# Patient Record
Sex: FEMALE | Race: WHITE | Employment: PART TIME | URBAN - METROPOLITAN AREA
[De-identification: names, ages, dates, MRNs, and addresses within clinical notes are randomized per-mention and may not be internally consistent; named-entity substitution may affect disease eponyms.]

---

## 2017-10-06 ENCOUNTER — HOSPITAL ENCOUNTER (OUTPATIENT)
Dept: WOMENS IMAGING | Age: 52
Discharge: HOME | End: 2017-10-06
Payer: COMMERCIAL

## 2017-10-06 DIAGNOSIS — Z12.31 SCREENING MAMMOGRAM, ENCOUNTER FOR: ICD-10-CM

## 2017-10-06 PROCEDURE — 77063 BREAST TOMOSYNTHESIS BI: CPT

## 2017-11-16 ENCOUNTER — OFFICE VISIT (OUTPATIENT)
Dept: PODIATRY | Age: 52
End: 2017-11-16
Payer: COMMERCIAL

## 2017-11-16 VITALS
HEIGHT: 62 IN | BODY MASS INDEX: 33.13 KG/M2 | DIASTOLIC BLOOD PRESSURE: 79 MMHG | HEART RATE: 87 BPM | SYSTOLIC BLOOD PRESSURE: 130 MMHG | WEIGHT: 180 LBS

## 2017-11-16 DIAGNOSIS — M19.071 PRIMARY OSTEOARTHRITIS OF RIGHT FOOT: ICD-10-CM

## 2017-11-16 DIAGNOSIS — M20.5X1 HALLUX LIMITUS, RIGHT: Primary | ICD-10-CM

## 2017-11-16 PROCEDURE — 73630 X-RAY EXAM OF FOOT: CPT | Performed by: PODIATRIST

## 2017-11-16 PROCEDURE — 99203 OFFICE O/P NEW LOW 30 MIN: CPT | Performed by: PODIATRIST

## 2017-11-16 RX ORDER — M-VIT,TX,IRON,MINS/CALC/FOLIC 27MG-0.4MG
1 TABLET ORAL DAILY
COMMUNITY
End: 2018-09-19 | Stop reason: SDUPTHER

## 2017-11-16 RX ORDER — FENOFIBRATE 90 MG/1
1 CAPSULE ORAL
COMMUNITY
End: 2018-07-31 | Stop reason: ALTCHOICE

## 2017-11-16 RX ORDER — ATORVASTATIN CALCIUM 40 MG/1
1 TABLET, FILM COATED ORAL
COMMUNITY

## 2017-11-16 ASSESSMENT — ENCOUNTER SYMPTOMS
CONSTIPATION: 0
NAUSEA: 0
VOMITING: 0
COLOR CHANGE: 0
DIARRHEA: 0

## 2017-11-16 NOTE — PROGRESS NOTES
[facility]  New Patient History and Physical    Chief Complaint:   Chief Complaint   Patient presents with    Foot Pain     right hallux        HPI: Valeria Burrell is a 46 y.o. female who presents to the office today complaining of right foot pain. Symptoms began several year(s) ago. Patient relates pain is Present. Pain is rated 4 out of 10 and is described as waxing and waning. Treatments prior to today's visit include: none. She had an injury over 3 years ago. Pain has come and gone, but now getting worse, bump is getting bigger, rubs on shoes. No treatment. Currently denies F/C/N/V. No Known Allergies    No past medical history on file. Prior to Admission medications    Medication Sig Start Date End Date Taking? Authorizing Provider   atorvastatin (LIPITOR) 40 MG tablet Take 1 tablet by mouth   Yes Historical Provider, MD   fenofibrate micronized (LOFIBRA) 90 MG CAPS capsule Take 1 capsule by mouth   Yes Historical Provider, MD   Multiple Vitamins-Minerals (THERAPEUTIC MULTIVITAMIN-MINERALS) tablet Take 1 tablet by mouth daily   Yes Historical Provider, MD       No past surgical history on file. No family history on file. Social History   Substance Use Topics    Smoking status: Never Smoker    Smokeless tobacco: Never Used    Alcohol use No       Review of Systems   Constitutional: Negative for chills, diaphoresis, fatigue, fever and unexpected weight change. Cardiovascular: Negative for leg swelling. Gastrointestinal: Negative for constipation, diarrhea, nausea and vomiting. Musculoskeletal: Positive for gait problem. Negative for arthralgias and joint swelling. Skin: Negative for color change, pallor, rash and wound. Neurological: Negative for weakness and numbness. Lower Extremity Physical Examination:     Vitals:   Vitals:    11/16/17 1513   BP: 130/79   Pulse: 87     General: AAO x 3 in NAD.      Vascular: DP and PT pulses palpable 2/4, Bilateral.  CFT <3

## 2018-03-07 ENCOUNTER — OFFICE VISIT (OUTPATIENT)
Dept: PODIATRY | Age: 53
End: 2018-03-07
Payer: COMMERCIAL

## 2018-03-07 VITALS
BODY MASS INDEX: 33.13 KG/M2 | DIASTOLIC BLOOD PRESSURE: 82 MMHG | WEIGHT: 180 LBS | HEIGHT: 62 IN | SYSTOLIC BLOOD PRESSURE: 120 MMHG | HEART RATE: 86 BPM

## 2018-03-07 DIAGNOSIS — S93.602A SPRAIN OF LEFT FOOT, INITIAL ENCOUNTER: ICD-10-CM

## 2018-03-07 DIAGNOSIS — M72.2 PLANTAR FASCIITIS, LEFT: Primary | ICD-10-CM

## 2018-03-07 PROCEDURE — 73630 X-RAY EXAM OF FOOT: CPT | Performed by: PODIATRIST

## 2018-03-07 PROCEDURE — 99213 OFFICE O/P EST LOW 20 MIN: CPT | Performed by: PODIATRIST

## 2018-03-07 RX ORDER — FENOFIBRATE 90 MG/1
90 CAPSULE ORAL
COMMUNITY

## 2018-03-07 ASSESSMENT — ENCOUNTER SYMPTOMS
VOMITING: 0
CONSTIPATION: 0
COLOR CHANGE: 0
NAUSEA: 0
DIARRHEA: 0

## 2018-03-07 NOTE — PROGRESS NOTES
General: AAO x 3 in NAD. Musculoskeletal:Pain present upon palpation of central arch, bands of the plantar fascia,  medial calcaneal  tubercle, left. No pain with squeeze of the heel. No bruising. Vascular: DP and PT pulses palpable 2/4, Bilateral.  CFT <3 seconds, Bilateral.  Hair growth present to the level of the digits, Bilateral.  Edema present, Left. Varicosities absent, Bilateral. Erythema absent, Bilateral    Neurological: Sensation intact to light touch to level of digits, Bilateral.      Integument: Warm, dry, supple, Bilateral.  Open lesion absent, Bilateral.      Radiographs:  3 views left Foot:  Small plantar calcaneal exostosis, No posterior calcaneal exostosis. No acute pathology. Small remote, old in nature, avulsion of the medial tubercle of the navicular. Asessment: Patient is a 46 y.o. female with:  1. Plantar fasciitis, left    2. Sprain of left foot, initial encounter        Plan: Patient examined and evaluated. Current condition and treatment options discussed in detail. Advised pt to rest ice. Verbal and written instructions given to patient. Good shoes  Duexis and samples  Ice, rest  No barefoot walking. Orders:   Orders Placed This Encounter   Procedures    XR FOOT LEFT (MIN 3 VIEWS)      Contact office with any questions/problems/concerns. RTC in as needed.     3/7/2018

## 2018-06-21 ENCOUNTER — OFFICE VISIT (OUTPATIENT)
Dept: PODIATRY | Age: 53
End: 2018-06-21
Payer: COMMERCIAL

## 2018-06-21 VITALS
WEIGHT: 180 LBS | HEIGHT: 62 IN | DIASTOLIC BLOOD PRESSURE: 69 MMHG | HEART RATE: 92 BPM | BODY MASS INDEX: 33.13 KG/M2 | SYSTOLIC BLOOD PRESSURE: 115 MMHG

## 2018-06-21 DIAGNOSIS — M19.071 PRIMARY OSTEOARTHRITIS OF RIGHT FOOT: ICD-10-CM

## 2018-06-21 DIAGNOSIS — Z98.890 POST-OPERATIVE STATE: ICD-10-CM

## 2018-06-21 DIAGNOSIS — M20.5X1 HALLUX LIMITUS, RIGHT: Primary | ICD-10-CM

## 2018-06-21 PROCEDURE — 73630 X-RAY EXAM OF FOOT: CPT | Performed by: PODIATRIST

## 2018-06-21 PROCEDURE — 99024 POSTOP FOLLOW-UP VISIT: CPT | Performed by: PODIATRIST

## 2018-07-02 ENCOUNTER — OFFICE VISIT (OUTPATIENT)
Dept: PODIATRY | Age: 53
End: 2018-07-02

## 2018-07-02 VITALS
BODY MASS INDEX: 33.13 KG/M2 | DIASTOLIC BLOOD PRESSURE: 55 MMHG | HEART RATE: 75 BPM | WEIGHT: 180 LBS | HEIGHT: 62 IN | SYSTOLIC BLOOD PRESSURE: 116 MMHG

## 2018-07-02 DIAGNOSIS — M20.5X1 HALLUX LIMITUS, RIGHT: Primary | ICD-10-CM

## 2018-07-02 DIAGNOSIS — Z98.890 POST-OPERATIVE STATE: ICD-10-CM

## 2018-07-02 DIAGNOSIS — M19.071 PRIMARY OSTEOARTHRITIS OF RIGHT FOOT: ICD-10-CM

## 2018-07-02 PROCEDURE — 99024 POSTOP FOLLOW-UP VISIT: CPT | Performed by: PODIATRIST

## 2018-07-02 NOTE — PROGRESS NOTES
[address] [facility]  Post Op note  Chief Complaint   Patient presents with    Post-Op Check     Right foot, doing good. Patient stated she changed bandage       Savanna Mosqueda is a 46y.o. year old female who is 2 weeks post op from foot surgery. Type: Cheilectomy right first mpj. Vital signs are stable. Pain level is 4. Patient denies N/V/F/C. Patient has been compliant with postoperative instructions. She did change the bandages however. Review of Systems    Vascular: DP and PT pulses palpable 2/4, Right Foot and 2/4 on the Left Foot. CFT <3 seconds, Right Foot and <3 seconds on the Left Foot. Edema is present,  Right Foot and absenton the Left Foot. Neurological:   Sensation present  to light touch to level of digits, both feet. Musculoskeletal:   Muscle strength is 5/5 on the Right Foot and 5/5 on the Left Foot. Structural deformities are absent on the Right Foot and absent on the Left Foot. Integument:  Warm, dry, supple both feet. Incisions are healing well. Wound dehiscence is absent. Infection is absent. Radiographs: 3 views right Foot:  Adequate removal of the dorsal and medial osteophytes of the first metatarsal head. Assesment : Post operative progress gradually improving. Diagnosis Orders   1. Hallux limitus, right     2. Primary osteoarthritis of right foot     3. Post-operative state           Plan: Sutures removed. Dry sterile dressing applied. OK to shower. Ice and elevation as directed. Surgical shoe, start ROM  Start shoe in 2 weeks  No orders of the defined types were placed in this encounter. Follow up 4 week(s).

## 2018-07-31 ENCOUNTER — OFFICE VISIT (OUTPATIENT)
Dept: PODIATRY | Age: 53
End: 2018-07-31

## 2018-07-31 VITALS
DIASTOLIC BLOOD PRESSURE: 76 MMHG | HEIGHT: 62 IN | BODY MASS INDEX: 33.13 KG/M2 | WEIGHT: 180 LBS | HEART RATE: 78 BPM | SYSTOLIC BLOOD PRESSURE: 118 MMHG

## 2018-07-31 DIAGNOSIS — Z98.890 POST-OPERATIVE STATE: ICD-10-CM

## 2018-07-31 DIAGNOSIS — M20.5X1 HALLUX LIMITUS, RIGHT: Primary | ICD-10-CM

## 2018-07-31 DIAGNOSIS — M19.071 PRIMARY OSTEOARTHRITIS OF RIGHT FOOT: ICD-10-CM

## 2018-07-31 PROCEDURE — 99024 POSTOP FOLLOW-UP VISIT: CPT | Performed by: PODIATRIST

## 2018-09-19 ENCOUNTER — OFFICE VISIT (OUTPATIENT)
Dept: PODIATRY | Age: 53
End: 2018-09-19

## 2018-09-19 VITALS
BODY MASS INDEX: 33.13 KG/M2 | HEIGHT: 62 IN | SYSTOLIC BLOOD PRESSURE: 112 MMHG | WEIGHT: 180 LBS | DIASTOLIC BLOOD PRESSURE: 68 MMHG | HEART RATE: 72 BPM

## 2018-09-19 DIAGNOSIS — M19.071 PRIMARY OSTEOARTHRITIS OF RIGHT FOOT: ICD-10-CM

## 2018-09-19 DIAGNOSIS — Z98.890 POST-OPERATIVE STATE: ICD-10-CM

## 2018-09-19 DIAGNOSIS — M20.5X1 HALLUX LIMITUS, RIGHT: Primary | ICD-10-CM

## 2018-09-19 PROCEDURE — 99024 POSTOP FOLLOW-UP VISIT: CPT | Performed by: PODIATRIST

## 2018-09-19 RX ORDER — MULTIVIT,IRON,MINERALS/LUTEIN
1 TABLET ORAL
COMMUNITY

## 2018-10-23 ENCOUNTER — HOSPITAL ENCOUNTER (OUTPATIENT)
Dept: WOMENS IMAGING | Age: 53
Discharge: HOME | End: 2018-10-25
Payer: COMMERCIAL

## 2018-10-23 DIAGNOSIS — Z12.31 VISIT FOR SCREENING MAMMOGRAM: ICD-10-CM

## 2018-10-23 PROCEDURE — 77067 SCR MAMMO BI INCL CAD: CPT

## 2019-12-27 ENCOUNTER — HOSPITAL ENCOUNTER (OUTPATIENT)
Dept: WOMENS IMAGING | Age: 54
Discharge: HOME | End: 2019-12-29
Payer: COMMERCIAL

## 2019-12-27 DIAGNOSIS — Z13.9 ENCOUNTER FOR SCREENING: ICD-10-CM

## 2019-12-27 PROCEDURE — 77063 BREAST TOMOSYNTHESIS BI: CPT

## 2021-04-01 ENCOUNTER — HOSPITAL ENCOUNTER (OUTPATIENT)
Dept: WOMENS IMAGING | Age: 56
Discharge: HOME | End: 2021-04-03
Payer: COMMERCIAL

## 2021-04-01 DIAGNOSIS — Z12.39 SCREENING BREAST EXAMINATION: ICD-10-CM

## 2021-04-01 PROCEDURE — 77063 BREAST TOMOSYNTHESIS BI: CPT

## 2023-12-07 ENCOUNTER — OFFICE VISIT (OUTPATIENT)
Dept: PODIATRY | Age: 58
End: 2023-12-07

## 2023-12-07 VITALS — BODY MASS INDEX: 32.2 KG/M2 | WEIGHT: 175 LBS | HEIGHT: 62 IN

## 2023-12-07 DIAGNOSIS — M25.571 SINUS TARSITIS, RIGHT: ICD-10-CM

## 2023-12-07 DIAGNOSIS — M19.071 OSTEOARTHRITIS OF FIRST METATARSOPHALANGEAL (MTP) JOINT OF RIGHT FOOT: ICD-10-CM

## 2023-12-07 DIAGNOSIS — M72.2 PLANTAR FASCIITIS, LEFT: ICD-10-CM

## 2023-12-07 DIAGNOSIS — M79.672 BILATERAL FOOT PAIN: Primary | ICD-10-CM

## 2023-12-07 DIAGNOSIS — M79.671 BILATERAL FOOT PAIN: Primary | ICD-10-CM

## 2023-12-07 DIAGNOSIS — M77.8 ENTHESOPATHY OF LEFT FOOT: ICD-10-CM

## 2023-12-07 PROCEDURE — 99204 OFFICE O/P NEW MOD 45 MIN: CPT | Performed by: PODIATRIST

## 2023-12-07 RX ORDER — CETIRIZINE HYDROCHLORIDE 10 MG/1
10 TABLET ORAL DAILY
COMMUNITY

## 2023-12-07 NOTE — PROGRESS NOTES
[facility]  New Patient History and Physical    Chief Complaint:   Chief Complaint   Patient presents with    Foot Pain     Right foot        HPI: Agustin Bragg is a 62 y.o. female who presents to the office today complaining of bilateral foot pain    Right lateral ankle, top of foot. Started over the summer. Was walking 3 miles a day. Changes shoes often, no inserts, no injury, no medications for the pain. Was swollen. Left heel pain, pain central, worse after rest and in the morning. No treatment. Present for a few months. Currently denies F/C/N/V. Had cheilectomy right first mpj,  2018,     No Known Allergies    No past medical history on file. Prior to Admission medications    Medication Sig Start Date End Date Taking? Authorizing Provider   cetirizine (ZYRTEC) 10 MG tablet Take 1 tablet by mouth daily   Yes Nevin Fang MD   Multiple Vitamins-Minerals (CENTRUM SILVER ULTRA WOMENS) TABS Take 1 capsule by mouth   Yes Nevin Fang MD   fenofibrate micronized (LOFIBRA) 90 MG CAPS capsule Take 1 capsule by mouth daily (with breakfast)   Yes Nevin Fang MD   atorvastatin (LIPITOR) 40 MG tablet Take 1 tablet by mouth   Yes Nevin Fang MD       Past Surgical History:   Procedure Laterality Date     SECTION      ;     SHOULDER SURGERY Left 2016       No family history on file. Social History     Tobacco Use    Smoking status: Never    Smokeless tobacco: Never   Substance Use Topics    Alcohol use: No       Review of Systems    Lower Extremity Physical Examination:     Vitals: There were no vitals filed for this visit. General: AAO x 3 in NAD.      Vascular: DP and PT pulses palpable 2/4, Bilateral.  CFT <3 seconds, Bilateral.  Hair growth present to the level of the digits, Bilateral.  Edema absent, Bilateral.  Varicosities absent, Bilateral. Erythema absent, Bilateral    Neurological:  Sensation present to light touch to level